# Patient Record
Sex: FEMALE | Race: WHITE | NOT HISPANIC OR LATINO | Employment: FULL TIME | URBAN - METROPOLITAN AREA
[De-identification: names, ages, dates, MRNs, and addresses within clinical notes are randomized per-mention and may not be internally consistent; named-entity substitution may affect disease eponyms.]

---

## 2023-08-25 PROBLEM — M19.90 INFLAMMATORY ARTHRITIS: Status: ACTIVE | Noted: 2023-08-25

## 2023-08-25 PROBLEM — K20.90 ESOPHAGITIS: Status: ACTIVE | Noted: 2023-08-25

## 2023-08-25 PROBLEM — M54.9 MID-BACK PAIN, ACUTE: Status: ACTIVE | Noted: 2023-08-25

## 2023-08-25 PROBLEM — M54.2 CERVICALGIA: Status: ACTIVE | Noted: 2023-08-25

## 2023-08-25 PROBLEM — K64.9 HEMORRHOIDS: Status: ACTIVE | Noted: 2023-08-25

## 2023-08-25 PROBLEM — K59.09 CHRONIC CONSTIPATION: Status: ACTIVE | Noted: 2023-08-25

## 2023-08-25 PROBLEM — R51.9 HEADACHE: Status: ACTIVE | Noted: 2023-08-25

## 2023-08-25 RX ORDER — PREDNISONE 20 MG/1
20 TABLET ORAL DAILY
COMMUNITY
Start: 2022-06-06

## 2023-08-25 RX ORDER — BENZONATATE 100 MG/1
100 CAPSULE ORAL EVERY 8 HOURS PRN
COMMUNITY
Start: 2023-01-30

## 2023-08-25 RX ORDER — CYCLOBENZAPRINE HCL 10 MG
10 TABLET ORAL 3 TIMES DAILY PRN
COMMUNITY
Start: 2020-07-30

## 2023-08-25 RX ORDER — MEDROXYPROGESTERONE ACETATE 150 MG/ML
150 INJECTION, SUSPENSION INTRAMUSCULAR
COMMUNITY
Start: 2015-08-11

## 2023-08-25 RX ORDER — NAPROXEN SODIUM 550 MG/1
550 TABLET ORAL
COMMUNITY
Start: 2020-07-30

## 2023-08-25 RX ORDER — IBUPROFEN 200 MG
200 TABLET ORAL EVERY 6 HOURS PRN
COMMUNITY

## 2023-08-25 RX ORDER — ALBUTEROL SULFATE 90 UG/1
2 AEROSOL, METERED RESPIRATORY (INHALATION) EVERY 6 HOURS PRN
COMMUNITY
Start: 2023-01-30

## 2023-08-25 RX ORDER — ACETAMINOPHEN 500 MG
1000 TABLET ORAL EVERY 6 HOURS PRN
COMMUNITY

## 2023-12-30 ENCOUNTER — APPOINTMENT (OUTPATIENT)
Dept: RADIOLOGY | Facility: HOSPITAL | Age: 38
End: 2023-12-30
Payer: COMMERCIAL

## 2023-12-30 ENCOUNTER — HOSPITAL ENCOUNTER (EMERGENCY)
Facility: HOSPITAL | Age: 38
Discharge: HOME | End: 2023-12-30
Attending: EMERGENCY MEDICINE
Payer: COMMERCIAL

## 2023-12-30 VITALS
TEMPERATURE: 98.6 F | SYSTOLIC BLOOD PRESSURE: 128 MMHG | DIASTOLIC BLOOD PRESSURE: 80 MMHG | HEIGHT: 61 IN | OXYGEN SATURATION: 98 % | BODY MASS INDEX: 32.1 KG/M2 | RESPIRATION RATE: 16 BRPM | WEIGHT: 170 LBS | HEART RATE: 70 BPM

## 2023-12-30 DIAGNOSIS — N83.209 CYST OF OVARY, UNSPECIFIED LATERALITY: ICD-10-CM

## 2023-12-30 DIAGNOSIS — R10.9 ABDOMINAL PAIN, UNSPECIFIED ABDOMINAL LOCATION: Primary | ICD-10-CM

## 2023-12-30 LAB
ALBUMIN SERPL BCP-MCNC: 4.4 G/DL (ref 3.4–5)
ALP SERPL-CCNC: 46 U/L (ref 33–110)
ALT SERPL W P-5'-P-CCNC: 14 U/L (ref 7–45)
ANION GAP SERPL CALC-SCNC: 13 MMOL/L (ref 10–20)
APPEARANCE UR: CLEAR
AST SERPL W P-5'-P-CCNC: 17 U/L (ref 9–39)
BACTERIA #/AREA URNS AUTO: ABNORMAL /HPF
BASOPHILS # BLD AUTO: 0.03 X10*3/UL (ref 0–0.1)
BASOPHILS NFR BLD AUTO: 0.4 %
BILIRUB SERPL-MCNC: 0.3 MG/DL (ref 0–1.2)
BILIRUB UR STRIP.AUTO-MCNC: NEGATIVE MG/DL
BUN SERPL-MCNC: 19 MG/DL (ref 6–23)
CALCIUM SERPL-MCNC: 9.8 MG/DL (ref 8.6–10.3)
CHLORIDE SERPL-SCNC: 104 MMOL/L (ref 98–107)
CO2 SERPL-SCNC: 28 MMOL/L (ref 21–32)
COLOR UR: ABNORMAL
CREAT SERPL-MCNC: 0.99 MG/DL (ref 0.5–1.05)
EOSINOPHIL # BLD AUTO: 0.23 X10*3/UL (ref 0–0.7)
EOSINOPHIL NFR BLD AUTO: 2.7 %
ERYTHROCYTE [DISTWIDTH] IN BLOOD BY AUTOMATED COUNT: 12.5 % (ref 11.5–14.5)
FLUAV RNA RESP QL NAA+PROBE: NOT DETECTED
FLUBV RNA RESP QL NAA+PROBE: NOT DETECTED
GFR SERPL CREATININE-BSD FRML MDRD: 76 ML/MIN/1.73M*2
GLUCOSE SERPL-MCNC: 90 MG/DL (ref 74–99)
GLUCOSE UR STRIP.AUTO-MCNC: NEGATIVE MG/DL
HCG UR QL IA.RAPID: NEGATIVE
HCT VFR BLD AUTO: 41.9 % (ref 36–46)
HGB BLD-MCNC: 14 G/DL (ref 12–16)
IMM GRANULOCYTES # BLD AUTO: 0.02 X10*3/UL (ref 0–0.7)
IMM GRANULOCYTES NFR BLD AUTO: 0.2 % (ref 0–0.9)
KETONES UR STRIP.AUTO-MCNC: NEGATIVE MG/DL
LACTATE SERPL-SCNC: 0.7 MMOL/L (ref 0.4–2)
LEUKOCYTE ESTERASE UR QL STRIP.AUTO: NEGATIVE
LYMPHOCYTES # BLD AUTO: 2.7 X10*3/UL (ref 1.2–4.8)
LYMPHOCYTES NFR BLD AUTO: 32.1 %
MCH RBC QN AUTO: 32.3 PG (ref 26–34)
MCHC RBC AUTO-ENTMCNC: 33.4 G/DL (ref 32–36)
MCV RBC AUTO: 97 FL (ref 80–100)
MONOCYTES # BLD AUTO: 0.77 X10*3/UL (ref 0.1–1)
MONOCYTES NFR BLD AUTO: 9.2 %
MUCOUS THREADS #/AREA URNS AUTO: ABNORMAL /LPF
NEUTROPHILS # BLD AUTO: 4.65 X10*3/UL (ref 1.2–7.7)
NEUTROPHILS NFR BLD AUTO: 55.4 %
NITRITE UR QL STRIP.AUTO: POSITIVE
NRBC BLD-RTO: 0 /100 WBCS (ref 0–0)
PH UR STRIP.AUTO: 6 [PH]
PLATELET # BLD AUTO: 318 X10*3/UL (ref 150–450)
POTASSIUM SERPL-SCNC: 4.1 MMOL/L (ref 3.5–5.3)
PROT SERPL-MCNC: 6.8 G/DL (ref 6.4–8.2)
PROT UR STRIP.AUTO-MCNC: NEGATIVE MG/DL
RBC # BLD AUTO: 4.34 X10*6/UL (ref 4–5.2)
RBC # UR STRIP.AUTO: NEGATIVE /UL
RBC #/AREA URNS AUTO: ABNORMAL /HPF
RSV RNA RESP QL NAA+PROBE: NOT DETECTED
SARS-COV-2 RNA RESP QL NAA+PROBE: NOT DETECTED
SODIUM SERPL-SCNC: 141 MMOL/L (ref 136–145)
SP GR UR STRIP.AUTO: 1.04
UROBILINOGEN UR STRIP.AUTO-MCNC: 4 MG/DL
WBC # BLD AUTO: 8.4 X10*3/UL (ref 4.4–11.3)
WBC #/AREA URNS AUTO: ABNORMAL /HPF
YEAST BUDDING #/AREA UR COMP ASSIST: PRESENT /HPF

## 2023-12-30 PROCEDURE — 2500000004 HC RX 250 GENERAL PHARMACY W/ HCPCS (ALT 636 FOR OP/ED): Performed by: EMERGENCY MEDICINE

## 2023-12-30 PROCEDURE — 87637 SARSCOV2&INF A&B&RSV AMP PRB: CPT | Performed by: EMERGENCY MEDICINE

## 2023-12-30 PROCEDURE — 96374 THER/PROPH/DIAG INJ IV PUSH: CPT | Mod: 59

## 2023-12-30 PROCEDURE — 83605 ASSAY OF LACTIC ACID: CPT | Performed by: EMERGENCY MEDICINE

## 2023-12-30 PROCEDURE — 81001 URINALYSIS AUTO W/SCOPE: CPT | Performed by: EMERGENCY MEDICINE

## 2023-12-30 PROCEDURE — 96361 HYDRATE IV INFUSION ADD-ON: CPT

## 2023-12-30 PROCEDURE — 74177 CT ABD & PELVIS W/CONTRAST: CPT

## 2023-12-30 PROCEDURE — 74177 CT ABD & PELVIS W/CONTRAST: CPT | Performed by: STUDENT IN AN ORGANIZED HEALTH CARE EDUCATION/TRAINING PROGRAM

## 2023-12-30 PROCEDURE — 85025 COMPLETE CBC W/AUTO DIFF WBC: CPT | Performed by: EMERGENCY MEDICINE

## 2023-12-30 PROCEDURE — 36415 COLL VENOUS BLD VENIPUNCTURE: CPT | Performed by: EMERGENCY MEDICINE

## 2023-12-30 PROCEDURE — 2550000001 HC RX 255 CONTRASTS: Performed by: EMERGENCY MEDICINE

## 2023-12-30 PROCEDURE — 81025 URINE PREGNANCY TEST: CPT | Performed by: EMERGENCY MEDICINE

## 2023-12-30 PROCEDURE — 99284 EMERGENCY DEPT VISIT MOD MDM: CPT | Performed by: EMERGENCY MEDICINE

## 2023-12-30 PROCEDURE — 80053 COMPREHEN METABOLIC PANEL: CPT | Performed by: EMERGENCY MEDICINE

## 2023-12-30 PROCEDURE — 96375 TX/PRO/DX INJ NEW DRUG ADDON: CPT

## 2023-12-30 RX ORDER — ONDANSETRON 4 MG/1
4 TABLET, ORALLY DISINTEGRATING ORAL EVERY 8 HOURS PRN
Qty: 20 TABLET | Refills: 0 | Status: SHIPPED | OUTPATIENT
Start: 2023-12-30 | End: 2023-12-31 | Stop reason: SDUPTHER

## 2023-12-30 RX ORDER — METHOCARBAMOL 500 MG/1
500 TABLET, FILM COATED ORAL 2 TIMES DAILY
Qty: 20 TABLET | Refills: 0 | Status: SHIPPED | OUTPATIENT
Start: 2023-12-30 | End: 2023-12-31 | Stop reason: SDUPTHER

## 2023-12-30 RX ORDER — KETOROLAC TROMETHAMINE 30 MG/ML
30 INJECTION, SOLUTION INTRAMUSCULAR; INTRAVENOUS ONCE
Status: COMPLETED | OUTPATIENT
Start: 2023-12-30 | End: 2023-12-30

## 2023-12-30 RX ORDER — ONDANSETRON HYDROCHLORIDE 2 MG/ML
4 INJECTION, SOLUTION INTRAVENOUS ONCE
Status: COMPLETED | OUTPATIENT
Start: 2023-12-30 | End: 2023-12-30

## 2023-12-30 RX ADMIN — IOHEXOL 75 ML: 350 INJECTION, SOLUTION INTRAVENOUS at 16:29

## 2023-12-30 RX ADMIN — ONDANSETRON 4 MG: 2 INJECTION INTRAMUSCULAR; INTRAVENOUS at 16:46

## 2023-12-30 RX ADMIN — SODIUM CHLORIDE 1000 ML: 9 INJECTION, SOLUTION INTRAVENOUS at 16:46

## 2023-12-30 RX ADMIN — KETOROLAC TROMETHAMINE 30 MG: 30 INJECTION, SOLUTION INTRAMUSCULAR at 17:54

## 2023-12-30 ASSESSMENT — LIFESTYLE VARIABLES
HAVE YOU EVER FELT YOU SHOULD CUT DOWN ON YOUR DRINKING: NO
HAVE PEOPLE ANNOYED YOU BY CRITICIZING YOUR DRINKING: NO
REASON UNABLE TO ASSESS: NO
EVER HAD A DRINK FIRST THING IN THE MORNING TO STEADY YOUR NERVES TO GET RID OF A HANGOVER: NO
EVER FELT BAD OR GUILTY ABOUT YOUR DRINKING: NO

## 2023-12-30 ASSESSMENT — PAIN - FUNCTIONAL ASSESSMENT
PAIN_FUNCTIONAL_ASSESSMENT: 0-10
PAIN_FUNCTIONAL_ASSESSMENT: 0-10

## 2023-12-30 ASSESSMENT — COLUMBIA-SUICIDE SEVERITY RATING SCALE - C-SSRS
1. IN THE PAST MONTH, HAVE YOU WISHED YOU WERE DEAD OR WISHED YOU COULD GO TO SLEEP AND NOT WAKE UP?: NO
2. HAVE YOU ACTUALLY HAD ANY THOUGHTS OF KILLING YOURSELF?: NO
6. HAVE YOU EVER DONE ANYTHING, STARTED TO DO ANYTHING, OR PREPARED TO DO ANYTHING TO END YOUR LIFE?: NO

## 2023-12-30 ASSESSMENT — PAIN DESCRIPTION - PAIN TYPE
TYPE: ACUTE PAIN
TYPE: ACUTE PAIN

## 2023-12-30 ASSESSMENT — PAIN DESCRIPTION - LOCATION
LOCATION: ABDOMEN
LOCATION: ABDOMEN

## 2023-12-30 ASSESSMENT — PAIN SCALES - GENERAL
PAINLEVEL_OUTOF10: 2
PAINLEVEL_OUTOF10: 5 - MODERATE PAIN

## 2023-12-30 ASSESSMENT — PAIN DESCRIPTION - DESCRIPTORS
DESCRIPTORS: ACHING
DESCRIPTORS: ACHING

## 2023-12-30 NOTE — ED PROVIDER NOTES
HPI   Chief Complaint   Patient presents with    Abdominal Pain       38 female here for chief complaint of nausea and pressure in the pelvic region.  Patient is sexually active last period was December 10.  She does not think she is pregnant.  She states she did a telehealth visit earlier this week and was placed on Macrobid for probable UTI but she does not think this is what it is.  She does not feel any better she actually feels worse 4 days later.  Denies any fevers or chills.  She has low back pain as well as low pelvic pain.                          No data recorded                  Patient History   Past Medical History:   Diagnosis Date    Encounter for gynecological examination (general) (routine) without abnormal findings 01/20/2015    Well woman exam with routine gynecological exam    Encounter for surveillance of injectable contraceptive 01/20/2015    Surveillance for Depo-Provera contraception     Past Surgical History:   Procedure Laterality Date    HEMORRHOID SURGERY  05/22/2013    Hemorrhoidectomy    MOUTH SURGERY  05/22/2013    Oral Surgery Tooth Extraction     Family History   Problem Relation Name Age of Onset    No Known Problems Father       Social History     Tobacco Use    Smoking status: Never    Smokeless tobacco: Never   Substance Use Topics    Alcohol use: Yes    Drug use: Never       Physical Exam   ED Triage Vitals [12/30/23 1507]   Temp Heart Rate Resp BP   37.3 °C (99.1 °F) 79 18 132/82      SpO2 Temp Source Heart Rate Source Patient Position   98 % Tympanic Monitor --      BP Location FiO2 (%)     -- --       Physical Exam  Vitals and nursing note reviewed.   Constitutional:       Appearance: Normal appearance.   HENT:      Head: Normocephalic and atraumatic.      Nose: Nose normal.      Mouth/Throat:      Mouth: Mucous membranes are moist.   Eyes:      Extraocular Movements: Extraocular movements intact.      Pupils: Pupils are equal, round, and reactive to light.   Cardiovascular:       Rate and Rhythm: Normal rate and regular rhythm.   Pulmonary:      Effort: Pulmonary effort is normal.      Breath sounds: Normal breath sounds.   Abdominal:      General: Abdomen is flat.      Palpations: Abdomen is soft.   Musculoskeletal:         General: Normal range of motion.      Cervical back: Normal range of motion.   Skin:     General: Skin is warm and dry.      Capillary Refill: Capillary refill takes less than 2 seconds.   Neurological:      General: No focal deficit present.      Mental Status: She is alert.   Psychiatric:         Mood and Affect: Mood normal.         ED Course & MDM   Diagnoses as of 01/09/24 2317   Abdominal pain, unspecified abdominal location   Cyst of ovary, unspecified laterality       Medical Decision Making      Medical Decision Making: Patient was worked up and found to have positive nitrites.  Negative for COVID flu and RSV.  Rest of the lab work is very reassuring.  CT abdomen pelvis shows no evidence of hydronephrosis or kidney stones.  It does show an ovarian cyst.  This could absolutely be the cause of her pain.  There lower like there are cysts on the kidneys.  Patient was given Toradol and Zofran here with a liter of fluid.  She feels much better upon repeat evaluation.  She will go home with Robaxin and Zofran.  She will follow-up with her gynecologist.  [unfilled]     EKG interpreted by myself (ED attending physician): N/A     Differential Diagnoses Considered:appendicitis diverticulitis pregnancy    Chronic Medical Conditions Significantly Affecting Care: N/A    External Records Reviewed: I reviewed recent and relevant outside records including: Previous labs and imaging studies    Social Determinants of Health Significantly Affecting Care: Lives with family currently    Diagnostic testing considered: Ultrasound pelvis but patient declined    Ana Maria Figueroa D.O.  Emergency Medicine          Procedure  Procedures     Ana Maria Figueroa  DO  01/09/24 2317

## 2023-12-30 NOTE — ED TRIAGE NOTES
Patient c/o generalized abdominal pain radiating into her back. Patient states she had a telehealth appointment 4 days ago, put on abx for a UTI but is now feeling worse with nausea.

## 2023-12-31 RX ORDER — METHOCARBAMOL 500 MG/1
500 TABLET, FILM COATED ORAL 2 TIMES DAILY
Qty: 20 TABLET | Refills: 0 | Status: SHIPPED | OUTPATIENT
Start: 2023-12-31 | End: 2024-06-11 | Stop reason: WASHOUT

## 2023-12-31 RX ORDER — ONDANSETRON 4 MG/1
4 TABLET, ORALLY DISINTEGRATING ORAL EVERY 8 HOURS PRN
Qty: 20 TABLET | Refills: 0 | Status: SHIPPED | OUTPATIENT
Start: 2023-12-31 | End: 2024-01-07

## 2024-04-08 ENCOUNTER — APPOINTMENT (OUTPATIENT)
Dept: OBSTETRICS AND GYNECOLOGY | Facility: CLINIC | Age: 39
End: 2024-04-08
Payer: COMMERCIAL

## 2024-04-12 ENCOUNTER — TELEPHONE (OUTPATIENT)
Dept: OBSTETRICS AND GYNECOLOGY | Facility: CLINIC | Age: 39
End: 2024-04-12
Payer: COMMERCIAL

## 2024-04-12 NOTE — TELEPHONE ENCOUNTER
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

## 2024-05-08 ENCOUNTER — APPOINTMENT (OUTPATIENT)
Dept: CARDIOLOGY | Facility: HOSPITAL | Age: 39
End: 2024-05-08
Payer: COMMERCIAL

## 2024-05-08 ENCOUNTER — APPOINTMENT (OUTPATIENT)
Dept: RADIOLOGY | Facility: HOSPITAL | Age: 39
End: 2024-05-08
Payer: COMMERCIAL

## 2024-05-08 ENCOUNTER — HOSPITAL ENCOUNTER (EMERGENCY)
Facility: HOSPITAL | Age: 39
Discharge: HOME | End: 2024-05-08
Payer: COMMERCIAL

## 2024-05-08 VITALS
OXYGEN SATURATION: 100 % | WEIGHT: 165 LBS | HEART RATE: 62 BPM | SYSTOLIC BLOOD PRESSURE: 146 MMHG | DIASTOLIC BLOOD PRESSURE: 90 MMHG | BODY MASS INDEX: 30.36 KG/M2 | HEIGHT: 62 IN | TEMPERATURE: 97 F | RESPIRATION RATE: 18 BRPM

## 2024-05-08 DIAGNOSIS — R03.0 ELEVATED BLOOD PRESSURE READING: Primary | ICD-10-CM

## 2024-05-08 DIAGNOSIS — R42 DIZZINESS: ICD-10-CM

## 2024-05-08 DIAGNOSIS — R51.9 NONINTRACTABLE HEADACHE, UNSPECIFIED CHRONICITY PATTERN, UNSPECIFIED HEADACHE TYPE: ICD-10-CM

## 2024-05-08 LAB
ALBUMIN SERPL BCP-MCNC: 4.5 G/DL (ref 3.4–5)
ALP SERPL-CCNC: 40 U/L (ref 33–110)
ALT SERPL W P-5'-P-CCNC: 14 U/L (ref 7–45)
ANION GAP SERPL CALC-SCNC: 13 MMOL/L (ref 10–20)
AST SERPL W P-5'-P-CCNC: 13 U/L (ref 9–39)
B-HCG SERPL-ACNC: <2 MIU/ML
BASOPHILS # BLD AUTO: 0.06 X10*3/UL (ref 0–0.1)
BASOPHILS NFR BLD AUTO: 0.7 %
BILIRUB SERPL-MCNC: 0.3 MG/DL (ref 0–1.2)
BUN SERPL-MCNC: 17 MG/DL (ref 6–23)
CALCIUM SERPL-MCNC: 9.9 MG/DL (ref 8.6–10.3)
CARDIAC TROPONIN I PNL SERPL HS: 3 NG/L (ref 0–13)
CARDIAC TROPONIN I PNL SERPL HS: <3 NG/L (ref 0–13)
CHLORIDE SERPL-SCNC: 105 MMOL/L (ref 98–107)
CO2 SERPL-SCNC: 26 MMOL/L (ref 21–32)
CREAT SERPL-MCNC: 0.76 MG/DL (ref 0.5–1.05)
EGFRCR SERPLBLD CKD-EPI 2021: >90 ML/MIN/1.73M*2
EOSINOPHIL # BLD AUTO: 0.43 X10*3/UL (ref 0–0.7)
EOSINOPHIL NFR BLD AUTO: 5.3 %
ERYTHROCYTE [DISTWIDTH] IN BLOOD BY AUTOMATED COUNT: 12.1 % (ref 11.5–14.5)
GLUCOSE SERPL-MCNC: 91 MG/DL (ref 74–99)
HCT VFR BLD AUTO: 41.6 % (ref 36–46)
HGB BLD-MCNC: 14.2 G/DL (ref 12–16)
IMM GRANULOCYTES # BLD AUTO: 0.02 X10*3/UL (ref 0–0.7)
IMM GRANULOCYTES NFR BLD AUTO: 0.2 % (ref 0–0.9)
LYMPHOCYTES # BLD AUTO: 3.01 X10*3/UL (ref 1.2–4.8)
LYMPHOCYTES NFR BLD AUTO: 37.4 %
MAGNESIUM SERPL-MCNC: 2.23 MG/DL (ref 1.6–2.4)
MCH RBC QN AUTO: 32.4 PG (ref 26–34)
MCHC RBC AUTO-ENTMCNC: 34.1 G/DL (ref 32–36)
MCV RBC AUTO: 95 FL (ref 80–100)
MONOCYTES # BLD AUTO: 0.75 X10*3/UL (ref 0.1–1)
MONOCYTES NFR BLD AUTO: 9.3 %
NEUTROPHILS # BLD AUTO: 3.78 X10*3/UL (ref 1.2–7.7)
NEUTROPHILS NFR BLD AUTO: 47.1 %
NRBC BLD-RTO: 0 /100 WBCS (ref 0–0)
PLATELET # BLD AUTO: 311 X10*3/UL (ref 150–450)
POTASSIUM SERPL-SCNC: 3.8 MMOL/L (ref 3.5–5.3)
PROT SERPL-MCNC: 6.7 G/DL (ref 6.4–8.2)
RBC # BLD AUTO: 4.38 X10*6/UL (ref 4–5.2)
SODIUM SERPL-SCNC: 140 MMOL/L (ref 136–145)
WBC # BLD AUTO: 8.1 X10*3/UL (ref 4.4–11.3)

## 2024-05-08 PROCEDURE — 36415 COLL VENOUS BLD VENIPUNCTURE: CPT | Performed by: PHYSICIAN ASSISTANT

## 2024-05-08 PROCEDURE — 70450 CT HEAD/BRAIN W/O DYE: CPT

## 2024-05-08 PROCEDURE — 71045 X-RAY EXAM CHEST 1 VIEW: CPT | Performed by: RADIOLOGY

## 2024-05-08 PROCEDURE — 96361 HYDRATE IV INFUSION ADD-ON: CPT | Performed by: PHYSICIAN ASSISTANT

## 2024-05-08 PROCEDURE — 96375 TX/PRO/DX INJ NEW DRUG ADDON: CPT | Performed by: PHYSICIAN ASSISTANT

## 2024-05-08 PROCEDURE — 96374 THER/PROPH/DIAG INJ IV PUSH: CPT | Performed by: PHYSICIAN ASSISTANT

## 2024-05-08 PROCEDURE — 85025 COMPLETE CBC W/AUTO DIFF WBC: CPT | Performed by: PHYSICIAN ASSISTANT

## 2024-05-08 PROCEDURE — 99284 EMERGENCY DEPT VISIT MOD MDM: CPT | Mod: 25 | Performed by: PHYSICIAN ASSISTANT

## 2024-05-08 PROCEDURE — 80053 COMPREHEN METABOLIC PANEL: CPT | Performed by: PHYSICIAN ASSISTANT

## 2024-05-08 PROCEDURE — 84702 CHORIONIC GONADOTROPIN TEST: CPT | Performed by: PHYSICIAN ASSISTANT

## 2024-05-08 PROCEDURE — 99285 EMERGENCY DEPT VISIT HI MDM: CPT | Mod: 25 | Performed by: PHYSICIAN ASSISTANT

## 2024-05-08 PROCEDURE — 83735 ASSAY OF MAGNESIUM: CPT | Performed by: PHYSICIAN ASSISTANT

## 2024-05-08 PROCEDURE — 93005 ELECTROCARDIOGRAM TRACING: CPT

## 2024-05-08 PROCEDURE — 71045 X-RAY EXAM CHEST 1 VIEW: CPT

## 2024-05-08 PROCEDURE — 2500000004 HC RX 250 GENERAL PHARMACY W/ HCPCS (ALT 636 FOR OP/ED): Performed by: PHYSICIAN ASSISTANT

## 2024-05-08 PROCEDURE — 70450 CT HEAD/BRAIN W/O DYE: CPT | Performed by: RADIOLOGY

## 2024-05-08 PROCEDURE — 84484 ASSAY OF TROPONIN QUANT: CPT | Performed by: PHYSICIAN ASSISTANT

## 2024-05-08 RX ORDER — DIPHENHYDRAMINE HYDROCHLORIDE 50 MG/ML
25 INJECTION INTRAMUSCULAR; INTRAVENOUS ONCE
Status: COMPLETED | OUTPATIENT
Start: 2024-05-08 | End: 2024-05-08

## 2024-05-08 RX ORDER — AMLODIPINE BESYLATE 2.5 MG/1
2.5 TABLET ORAL DAILY
Qty: 14 TABLET | Refills: 0 | Status: SHIPPED | OUTPATIENT
Start: 2024-05-08 | End: 2024-06-11

## 2024-05-08 RX ORDER — METOCLOPRAMIDE HYDROCHLORIDE 5 MG/ML
10 INJECTION INTRAMUSCULAR; INTRAVENOUS ONCE
Status: COMPLETED | OUTPATIENT
Start: 2024-05-08 | End: 2024-05-08

## 2024-05-08 RX ORDER — ACETAMINOPHEN 325 MG/1
975 TABLET ORAL ONCE
Status: COMPLETED | OUTPATIENT
Start: 2024-05-08 | End: 2024-05-08

## 2024-05-08 RX ADMIN — ACETAMINOPHEN 975 MG: 325 TABLET ORAL at 19:58

## 2024-05-08 RX ADMIN — METOCLOPRAMIDE 10 MG: 5 INJECTION, SOLUTION INTRAMUSCULAR; INTRAVENOUS at 19:58

## 2024-05-08 RX ADMIN — SODIUM CHLORIDE 1000 ML: 9 INJECTION, SOLUTION INTRAVENOUS at 18:26

## 2024-05-08 RX ADMIN — DIPHENHYDRAMINE HYDROCHLORIDE 25 MG: 50 INJECTION, SOLUTION INTRAMUSCULAR; INTRAVENOUS at 19:58

## 2024-05-08 ASSESSMENT — PAIN SCALES - GENERAL
PAINLEVEL_OUTOF10: 10 - WORST POSSIBLE PAIN
PAINLEVEL_OUTOF10: 7
PAINLEVEL_OUTOF10: 5 - MODERATE PAIN
PAINLEVEL_OUTOF10: 7

## 2024-05-08 ASSESSMENT — PAIN DESCRIPTION - LOCATION
LOCATION: HEAD
LOCATION: CHEST

## 2024-05-08 ASSESSMENT — LIFESTYLE VARIABLES
EVER HAD A DRINK FIRST THING IN THE MORNING TO STEADY YOUR NERVES TO GET RID OF A HANGOVER: NO
EVER FELT BAD OR GUILTY ABOUT YOUR DRINKING: NO
HAVE PEOPLE ANNOYED YOU BY CRITICIZING YOUR DRINKING: NO
TOTAL SCORE: 0
HAVE YOU EVER FELT YOU SHOULD CUT DOWN ON YOUR DRINKING: NO

## 2024-05-08 ASSESSMENT — COLUMBIA-SUICIDE SEVERITY RATING SCALE - C-SSRS
6. HAVE YOU EVER DONE ANYTHING, STARTED TO DO ANYTHING, OR PREPARED TO DO ANYTHING TO END YOUR LIFE?: NO
2. HAVE YOU ACTUALLY HAD ANY THOUGHTS OF KILLING YOURSELF?: NO
1. IN THE PAST MONTH, HAVE YOU WISHED YOU WERE DEAD OR WISHED YOU COULD GO TO SLEEP AND NOT WAKE UP?: NO

## 2024-05-08 ASSESSMENT — PAIN - FUNCTIONAL ASSESSMENT
PAIN_FUNCTIONAL_ASSESSMENT: 0-10
PAIN_FUNCTIONAL_ASSESSMENT: 0-10

## 2024-05-08 ASSESSMENT — PAIN DESCRIPTION - DESCRIPTORS: DESCRIPTORS: PRESSURE

## 2024-05-09 ENCOUNTER — APPOINTMENT (OUTPATIENT)
Dept: OBSTETRICS AND GYNECOLOGY | Facility: CLINIC | Age: 39
End: 2024-05-09
Payer: COMMERCIAL

## 2024-05-09 NOTE — ED PROVIDER NOTES
HPI   Chief Complaint   Patient presents with    Chest Pain    Hypertension       This is a 38-year-old female who denies significant PMH presenting for evaluation of dizziness, midsternal dull nonradiating nonexertional nonpleuritic chest discomfort, elevated blood pressure readings at home.  Has been noting escalating blood pressures in the context of dizziness/lightheadedness. Denies any associated shortness of breath, palpitations, cough, nausea, vomiting, diaphoresis, syncope, or near syncope. Denies any prior history of DVT/PE or coagulopathy, recent hospitalizations or immobilization, unilateral extremity swelling, recent prolonged travel, hemoptysis, history of malignancy, exogenous estrogen. Denies EtOH or illicits.  Non-smoker.      History provided by:  Patient   used: No                        Elena Coma Scale Score: 15                     Patient History   Past Medical History:   Diagnosis Date    Encounter for gynecological examination (general) (routine) without abnormal findings 01/20/2015    Well woman exam with routine gynecological exam    Encounter for surveillance of injectable contraceptive 01/20/2015    Surveillance for Depo-Provera contraception     Past Surgical History:   Procedure Laterality Date    HEMORRHOID SURGERY  05/22/2013    Hemorrhoidectomy    MOUTH SURGERY  05/22/2013    Oral Surgery Tooth Extraction     Family History   Problem Relation Name Age of Onset    No Known Problems Father       Social History     Tobacco Use    Smoking status: Never    Smokeless tobacco: Never   Substance Use Topics    Alcohol use: Yes    Drug use: Never       Physical Exam   ED Triage Vitals   Temperature Heart Rate Respirations BP   05/08/24 1648 05/08/24 1648 05/08/24 1648 05/08/24 1700   36.8 °C (98.2 °F) 86 18 (!) 150/105      Pulse Ox Temp src Heart Rate Source Patient Position   05/08/24 1648 -- -- 05/08/24 1648   98 %   Lying      BP Location FiO2 (%)     05/08/24 1648 --      Left arm        Physical Exam    General: Vitals noted, no distress. Afebrile  Neck: Trachea midline. No JVD appreciated.  Cardiac: Regular rate and rhythm. No murmur  Pulmonary: Lungs clear bilaterally with good aeration. No rales, rhonchi or wheezing  Abdomen: Soft. Nontender  Extremities: No peripheral edema  Skin: No rash  Neuro: No focal neurologic deficits    ED Course & MDM   Diagnoses as of 05/08/24 2051   Elevated blood pressure reading   Dizziness   Nonintractable headache, unspecified chronicity pattern, unspecified headache type       Medical Decision Making  DDx: Hypertensive urgency, angel luis-/myocarditis, ACS, renal failure, ICH    Patient is stable hemodynamically.  Initially blood pressure controlled but did have recurrent episodes of dizziness here with associated with estimated blood pressure readings over 200.  She also began developing a headache which was treated with Reglan Tylenol and Benadryl.  Her EKG is nonischemic.  The patient's Wells score is zero and is negative by the PERC criteria.  Therefore low suspicion for PE.  Her blood pressure is improved after her headache was treated.  Troponin and delta troponin negative.  Remainder of laboratory evaluation reassuring.  CT head showed no acute intracranial process as read by the radiologist.  Chest x-ray showed no acute cardiopulmonary process as read by the radiologist.  Patient will be started on low-dose amlodipine advise close follow-up with PCP within the next 1 week.  Instructed to return to the nearest ED if any concerns or new or worsening symptoms. Patient verbalized understanding and agreement with plan. Discharged in stable condition.      Disclaimer: This note was dictated using speech recognition software. An attempt at proofreading was made to minimize errors. Minor errors in transcription may be present. Please call if questions.    Amount and/or Complexity of Data Reviewed  Labs: ordered.  Radiology: ordered.  ECG/medicine  tests: ordered and independent interpretation performed.     Details: EKG interpreted by me: Normal sinus rhythm.  Rate 78.  QTc 430 ms.  Normal axis.  No acute T wave changes.  No STEMI.        Procedure  Procedures     Miquel Arenas PA-C  05/08/24 2050

## 2024-05-10 LAB
ATRIAL RATE: 78 BPM
P AXIS: 38 DEGREES
P OFFSET: 201 MS
P ONSET: 154 MS
PR INTERVAL: 130 MS
Q ONSET: 219 MS
QRS COUNT: 12 BEATS
QRS DURATION: 92 MS
QT INTERVAL: 378 MS
QTC CALCULATION(BAZETT): 430 MS
QTC FREDERICIA: 412 MS
R AXIS: 70 DEGREES
T AXIS: 41 DEGREES
T OFFSET: 408 MS
VENTRICULAR RATE: 78 BPM

## 2024-05-15 ENCOUNTER — TELEPHONE (OUTPATIENT)
Dept: OBSTETRICS AND GYNECOLOGY | Facility: CLINIC | Age: 39
End: 2024-05-15
Payer: COMMERCIAL

## 2024-05-15 NOTE — TELEPHONE ENCOUNTER
Pt   sched for colpo 07/03/2024 /Spoke to pt offered sooner appt for colpo / pt agreed/ pt moved to 06/11/2024

## 2024-06-10 NOTE — PROGRESS NOTES
Call 4/12/2024 from [facility]; pap neg; pos hpv 16; here for colpo.  Former pt Dr. Schneider; pos  hr hpv since 2019; colpo bxs from 2019 and 2021 neg  for [facility].        Colposcopy    Date/Time: 6/11/2024 9:18 AM    Performed by: Aranza March MD  Authorized by: Aranza March MD    Procedure location: cervix    Consent:     Patient questions answered: yes      Risks and benefits of the procedure and its alternatives discussed: yes      Procedural risks discussed:  Bleeding, repeat procedure and possible continued pain    Consent obtained:  Verbal    Consent given by:  Patient  Indication:     Cervical indication(s): HPV 16    Pre-procedure:     Speculum was placed in the vagina: yes      Prep solution(s): acetic acid    Procedure:     Colposcopy with: cervical biopsy      Colposcopy details:  Biopsy at 7:00 position of T zone; acetowhite lesion    Cervix visibility: fully visualized      SCJ visibility: fully visualized      Lesion visualized: fully visualized      Acetowhite lesion(s): cervix      Acetowhite lesion(s) comment:  None    Acetowhite lesion(s) description:  7:00    Vascular changes: cervix      Vascular changes comment:  None  Post-procedure:     Patient tolerance of procedure:  Patient tolerated the procedure well with no immediate complications  Comments:      Advised NOTHING inside vaginal canal for next 5 days

## 2024-06-11 ENCOUNTER — PROCEDURE VISIT (OUTPATIENT)
Dept: OBSTETRICS AND GYNECOLOGY | Facility: CLINIC | Age: 39
End: 2024-06-11
Payer: COMMERCIAL

## 2024-06-11 VITALS
DIASTOLIC BLOOD PRESSURE: 72 MMHG | SYSTOLIC BLOOD PRESSURE: 124 MMHG | HEIGHT: 62 IN | WEIGHT: 174 LBS | BODY MASS INDEX: 32.02 KG/M2

## 2024-06-11 DIAGNOSIS — N93.0 POSTCOITAL BLEEDING: Primary | ICD-10-CM

## 2024-06-11 DIAGNOSIS — B97.7 HIGH RISK HPV INFECTION: ICD-10-CM

## 2024-06-11 PROCEDURE — 57455 BIOPSY OF CERVIX W/SCOPE: CPT | Performed by: OBSTETRICS & GYNECOLOGY

## 2024-06-11 RX ORDER — OMEPRAZOLE 40 MG/1
CAPSULE, DELAYED RELEASE ORAL
COMMUNITY

## 2024-06-11 RX ORDER — EPINEPHRINE 0.3 MG/.3ML
INJECTION INTRAMUSCULAR
COMMUNITY
Start: 2024-03-01

## 2024-06-11 ASSESSMENT — ENCOUNTER SYMPTOMS
OCCASIONAL FEELINGS OF UNSTEADINESS: 0
DEPRESSION: 0
LOSS OF SENSATION IN FEET: 0

## 2024-06-11 ASSESSMENT — PATIENT HEALTH QUESTIONNAIRE - PHQ9
1. LITTLE INTEREST OR PLEASURE IN DOING THINGS: NOT AT ALL
2. FEELING DOWN, DEPRESSED OR HOPELESS: NOT AT ALL
SUM OF ALL RESPONSES TO PHQ9 QUESTIONS 1 & 2: 0

## 2024-06-11 ASSESSMENT — LIFESTYLE VARIABLES
SKIP TO QUESTIONS 9-10: 1
HOW MANY STANDARD DRINKS CONTAINING ALCOHOL DO YOU HAVE ON A TYPICAL DAY: 1 OR 2
HOW OFTEN DO YOU HAVE SIX OR MORE DRINKS ON ONE OCCASION: NEVER
HOW OFTEN DO YOU HAVE A DRINK CONTAINING ALCOHOL: 2-4 TIMES A MONTH
AUDIT-C TOTAL SCORE: 2

## 2024-06-11 ASSESSMENT — PAIN SCALES - GENERAL: PAINLEVEL: 0-NO PAIN

## 2024-06-14 ENCOUNTER — HOSPITAL ENCOUNTER (OUTPATIENT)
Dept: CARDIOLOGY | Facility: HOSPITAL | Age: 39
Discharge: HOME | End: 2024-06-14
Payer: COMMERCIAL

## 2024-06-14 DIAGNOSIS — I10 HTN (HYPERTENSION): ICD-10-CM

## 2024-06-14 LAB
LABORATORY COMMENT REPORT: NORMAL
PATH REPORT.FINAL DX SPEC: NORMAL
PATH REPORT.GROSS SPEC: NORMAL
PATH REPORT.RELEVANT HX SPEC: NORMAL
PATH REPORT.TOTAL CANCER: NORMAL

## 2024-06-14 PROCEDURE — 93306 TTE W/DOPPLER COMPLETE: CPT

## 2024-06-14 PROCEDURE — 93306 TTE W/DOPPLER COMPLETE: CPT | Performed by: STUDENT IN AN ORGANIZED HEALTH CARE EDUCATION/TRAINING PROGRAM

## 2024-06-16 LAB
AORTIC VALVE MEAN GRADIENT: 4 MMHG
AORTIC VALVE PEAK VELOCITY: 1.53 M/S
AV PEAK GRADIENT: 9.4 MMHG
AVA (PEAK VEL): 2.36 CM2
AVA (VTI): 2.12 CM2
EJECTION FRACTION APICAL 4 CHAMBER: 64.2
LEFT ATRIUM VOLUME AREA LENGTH INDEX BSA: 18.1 ML/M2
LEFT VENTRICLE INTERNAL DIMENSION DIASTOLE: 3.8 CM (ref 3.5–6)
LEFT VENTRICULAR OUTFLOW TRACT DIAMETER: 1.9 CM
LV EJECTION FRACTION BIPLANE: 65 %
MITRAL VALVE E/A RATIO: 1.74
MITRAL VALVE E/E' RATIO: 9.39
RIGHT VENTRICLE FREE WALL PEAK S': 12.3 CM/S
RIGHT VENTRICLE PEAK SYSTOLIC PRESSURE: 15.1 MMHG
TRICUSPID ANNULAR PLANE SYSTOLIC EXCURSION: 1.9 CM

## 2024-06-25 ENCOUNTER — HOSPITAL ENCOUNTER (OUTPATIENT)
Dept: RADIOLOGY | Facility: HOSPITAL | Age: 39
Discharge: HOME | End: 2024-06-25
Payer: COMMERCIAL

## 2024-06-25 DIAGNOSIS — N93.0 POSTCOITAL BLEEDING: ICD-10-CM

## 2024-06-25 PROCEDURE — 76856 US EXAM PELVIC COMPLETE: CPT

## 2024-06-25 PROCEDURE — 76830 TRANSVAGINAL US NON-OB: CPT | Performed by: RADIOLOGY

## 2024-06-25 PROCEDURE — 76856 US EXAM PELVIC COMPLETE: CPT | Performed by: RADIOLOGY

## 2024-07-03 ENCOUNTER — APPOINTMENT (OUTPATIENT)
Dept: OBSTETRICS AND GYNECOLOGY | Facility: CLINIC | Age: 39
End: 2024-07-03
Payer: COMMERCIAL

## 2024-07-21 ENCOUNTER — APPOINTMENT (OUTPATIENT)
Dept: RADIOLOGY | Facility: HOSPITAL | Age: 39
End: 2024-07-21
Payer: COMMERCIAL

## 2024-07-21 ENCOUNTER — HOSPITAL ENCOUNTER (EMERGENCY)
Facility: HOSPITAL | Age: 39
Discharge: HOME | End: 2024-07-22
Payer: COMMERCIAL

## 2024-07-21 DIAGNOSIS — S89.92XA INJURY OF LEFT KNEE, INITIAL ENCOUNTER: Primary | ICD-10-CM

## 2024-07-21 DIAGNOSIS — S99.912A INJURY OF LEFT ANKLE, INITIAL ENCOUNTER: ICD-10-CM

## 2024-07-21 PROCEDURE — 99284 EMERGENCY DEPT VISIT MOD MDM: CPT

## 2024-07-21 PROCEDURE — 73564 X-RAY EXAM KNEE 4 OR MORE: CPT | Mod: LT

## 2024-07-21 PROCEDURE — 73564 X-RAY EXAM KNEE 4 OR MORE: CPT | Mod: LEFT SIDE | Performed by: RADIOLOGY

## 2024-07-21 PROCEDURE — 73610 X-RAY EXAM OF ANKLE: CPT | Mod: LT

## 2024-07-21 PROCEDURE — 73610 X-RAY EXAM OF ANKLE: CPT | Mod: LEFT SIDE | Performed by: RADIOLOGY

## 2024-07-21 RX ORDER — KETOROLAC TROMETHAMINE 30 MG/ML
30 INJECTION, SOLUTION INTRAMUSCULAR; INTRAVENOUS ONCE
Status: COMPLETED | OUTPATIENT
Start: 2024-07-21 | End: 2024-07-22

## 2024-07-21 ASSESSMENT — LIFESTYLE VARIABLES
HAVE PEOPLE ANNOYED YOU BY CRITICIZING YOUR DRINKING: NO
EVER FELT BAD OR GUILTY ABOUT YOUR DRINKING: NO
EVER HAD A DRINK FIRST THING IN THE MORNING TO STEADY YOUR NERVES TO GET RID OF A HANGOVER: NO
HAVE YOU EVER FELT YOU SHOULD CUT DOWN ON YOUR DRINKING: NO
TOTAL SCORE: 0

## 2024-07-21 ASSESSMENT — PAIN DESCRIPTION - PAIN TYPE: TYPE: ACUTE PAIN

## 2024-07-21 ASSESSMENT — PAIN DESCRIPTION - ORIENTATION: ORIENTATION: LEFT

## 2024-07-21 ASSESSMENT — PAIN DESCRIPTION - DESCRIPTORS: DESCRIPTORS: ACHING

## 2024-07-21 ASSESSMENT — PAIN DESCRIPTION - LOCATION: LOCATION: KNEE

## 2024-07-21 ASSESSMENT — PAIN SCALES - GENERAL: PAINLEVEL_OUTOF10: 6

## 2024-07-21 ASSESSMENT — PAIN - FUNCTIONAL ASSESSMENT: PAIN_FUNCTIONAL_ASSESSMENT: 0-10

## 2024-07-21 NOTE — Clinical Note
Judy Willis was seen and treated in our emergency department on 7/21/2024.  She may return to work on 07/26/2024.       If you have any questions or concerns, please don't hesitate to call.      Pito Mendoza PA-C

## 2024-07-22 VITALS
TEMPERATURE: 98.6 F | HEART RATE: 77 BPM | BODY MASS INDEX: 30.36 KG/M2 | SYSTOLIC BLOOD PRESSURE: 141 MMHG | DIASTOLIC BLOOD PRESSURE: 88 MMHG | RESPIRATION RATE: 16 BRPM | OXYGEN SATURATION: 99 % | HEIGHT: 62 IN | WEIGHT: 165 LBS

## 2024-07-22 PROCEDURE — 96372 THER/PROPH/DIAG INJ SC/IM: CPT | Performed by: HEALTH CARE PROVIDER

## 2024-07-22 PROCEDURE — 2500000004 HC RX 250 GENERAL PHARMACY W/ HCPCS (ALT 636 FOR OP/ED): Performed by: HEALTH CARE PROVIDER

## 2024-07-22 NOTE — ED TRIAGE NOTES
Patient fell at work. Reporting left knee and ankle pain. Now states pain radiating to left hip and back. Walked up the stairs after injury and felt a pop in her knee. Patient slipped on ice that she had dropped on the floor. Did not hit her head. Also fell onto her back after hitting her knee. Positive DP in left foot, no deformity to left ankle or knee

## 2024-07-22 NOTE — ED PROVIDER NOTES
HPI   Chief Complaint   Patient presents with    Fall     Patient fell at work. Reporting left knee and ankle pain. Now states pain radiating to left hip and back. Walked up the stairs after injury and felt a pop in her knee. Patient slipped on ice that she had dropped on the floor. Did not hit her head. Also fell onto her back after hitting her knee.     Knee Injury       CC: Left knee, ankle injury  HPI:   38-year-old female presents ED after slipping at work and landing on her left knee, patient states hyperflexing the left knee and ankle underneath her when she slipped, she notes tenderness, difficulty with any weightbearing or ambulating on the left lower extremity.  Patient denies any previous injuries or surgeries to the left lower extremity.  Denies any distal numbness or paresthesia.    Additional Limitations to History:   External Records Reviewed: I reviewed recent and relevant outside records including   History Obtained From:     Past Medical History: Per HPI  Medications: Reviewed in EMR and with patient  Allergies:  Reviewed in EMR  Past Surgical History:   Social History:     ------------------------------------------------------------------------------------------------------  Physical Exam:  --Vital signs reviewed in nursing triage note, EMR flow sheets, and at patient's bedside  GEN:  A&Ox3, no acute distress, appears comfortable.  Conversational and appropriate.  No confusion or gross mental status changes.  EYES: EOMI, non-injected sclera.  ENT: Moist mucous membranes, no apparent injuries or lesions.   CARDIO: Normal rate and regular rhythm. No murmurs, rubs, or gallops.  2+ equal pulses of the distal extremities.   PULM: Clear to auscultation bilaterally. No rales, rhonchi, or wheezes. Good symmetric chest expansion.  GI: Soft, non-tender, non-distended. No rebound tenderness or guarding.  SKIN: Warm and dry, no rashes or lesions.  MSK: ROM intact the extremities without contractures.   EXT:  Knee injury:  Lower extremity: There is moderate tenderness over the medial joint line. There is moderate tenderness over the lateral joint line. The extensor mechanism is tender, limited secondary to pain. There is no obvious laxity.  Left ankle: Good flexion extension, mild tenderness on the medial and lateral malleoli are, mild tenderness with palpation of the dorsal aspect.  Good pedal pulses. Skin is intact. Is neurovascularly intact distally. There is no evidence of an intra-articular infection. Compartments are soft to palpation. There is no suggestion of DVT.  NEURO: Cranial nerves II-XII grossly intact. Sensation to light touch intact and equal bilaterally in upper and lower extremities.  Symmetric 5/5 strength in upper and lower extremities.  PSYCH: Appropriate mood and behavior, converses and responds appropriately during exam.  -------------------------------------------------------------------------------------------------------        Differential Diagnoses Considered:   Chronic Medical Conditions Significantly Affecting Care:   Diagnostic testing considered: [PERC, D-Dimer, PECARN, etc.]       - I independently interpreted: [CXR, CT, POCUS, etc. including your interpretation]  - Labs notable for     Escalation of Care: Appropriate for   Social Determinants of Health Significantly Affecting Care: [Homelessness, lacking transportation, uninsured, unable to afford medications]  Prescription Drug Consideration: [Antibiotics, antivirals, pain medications, etc.]  Discussion of Management with Other Providers:  I discussed the patient/results with: [admitting team, consultant, radiologist, social work, EPAT, case management, PT/OT, RT, PCP, etc.]      Pito Mendoza PA-C              Patient History   Past Medical History:   Diagnosis Date    Abnormal Pap smear of cervix     Anxiety and depression     COVID 2021    Encounter for gynecological examination (general) (routine) without abnormal findings  01/20/2015    Well woman exam with routine gynecological exam    Encounter for surveillance of injectable contraceptive 01/20/2015    Surveillance for Depo-Provera contraception    History of hemorrhoids     Hypertension     Migraines      Past Surgical History:   Procedure Laterality Date    HEMORRHOID SURGERY  05/22/2013    Hemorrhoidectomy    MOUTH SURGERY  05/22/2013    Oral Surgery Tooth Extraction     Family History   Adopted: Yes   Problem Relation Name Age of Onset    No Known Problems Father       Social History     Tobacco Use    Smoking status: Never    Smokeless tobacco: Never   Vaping Use    Vaping status: Some Days    Substances: Nicotine   Substance Use Topics    Alcohol use: Yes    Drug use: Never       Physical Exam   ED Triage Vitals [07/21/24 2122]   Temperature Heart Rate Respirations BP   37 °C (98.6 °F) 97 18 155/86      Pulse Ox Temp Source Heart Rate Source Patient Position   97 % Temporal Monitor Sitting      BP Location FiO2 (%)     Right arm --       Physical Exam      ED Course & MDM   Diagnoses as of 07/22/24 0021   Injury of left knee, initial encounter   Injury of left ankle, initial encounter                       Le[place] Coma Scale Score: 15                        Medical Decision Making  38-year-old female with left knee, ankle injury, imaging appears unremarkable, and the extremity is neurovascular intact distally, knee immobilizer, crutches, continue with rest, ice, elevation, outpatient follow-up with orthopedics, return to ED precautions given to patient.        Procedure  Procedures     Pito Mendoza PA-C  07/21/24 2342       Pito Mendoza PA-C  07/22/24 0022

## 2025-04-19 ENCOUNTER — HOSPITAL ENCOUNTER (OUTPATIENT)
Dept: RADIOLOGY | Facility: HOSPITAL | Age: 40
Discharge: HOME | End: 2025-04-19
Payer: COMMERCIAL

## 2025-04-19 DIAGNOSIS — R10.2 PELVIC AND PERINEAL PAIN: ICD-10-CM

## 2025-04-19 PROCEDURE — 76856 US EXAM PELVIC COMPLETE: CPT | Performed by: RADIOLOGY

## 2025-04-19 PROCEDURE — 76856 US EXAM PELVIC COMPLETE: CPT

## 2025-06-04 ENCOUNTER — HOSPITAL ENCOUNTER (OUTPATIENT)
Dept: RADIOLOGY | Facility: HOSPITAL | Age: 40
Discharge: HOME | End: 2025-06-04
Payer: COMMERCIAL

## 2025-06-04 DIAGNOSIS — M25.572 PAIN IN LEFT ANKLE AND JOINTS OF LEFT FOOT: ICD-10-CM

## 2025-06-04 PROCEDURE — 73610 X-RAY EXAM OF ANKLE: CPT | Mod: LT

## 2025-06-04 PROCEDURE — 73610 X-RAY EXAM OF ANKLE: CPT | Mod: LEFT SIDE | Performed by: RADIOLOGY
